# Patient Record
Sex: FEMALE | Race: ASIAN | HISPANIC OR LATINO
[De-identification: names, ages, dates, MRNs, and addresses within clinical notes are randomized per-mention and may not be internally consistent; named-entity substitution may affect disease eponyms.]

---

## 2021-02-13 DIAGNOSIS — Z01.818 ENCOUNTER FOR OTHER PREPROCEDURAL EXAMINATION: ICD-10-CM

## 2021-02-13 PROBLEM — Z00.00 ENCOUNTER FOR PREVENTIVE HEALTH EXAMINATION: Status: ACTIVE | Noted: 2021-02-13

## 2021-02-15 ENCOUNTER — APPOINTMENT (OUTPATIENT)
Dept: DISASTER EMERGENCY | Facility: CLINIC | Age: 56
End: 2021-02-15

## 2021-02-16 LAB — SARS-COV-2 N GENE NPH QL NAA+PROBE: NOT DETECTED

## 2022-06-09 ENCOUNTER — APPOINTMENT (OUTPATIENT)
Dept: ORTHOPEDIC SURGERY | Facility: CLINIC | Age: 57
End: 2022-06-09
Payer: COMMERCIAL

## 2022-06-09 VITALS — WEIGHT: 250 LBS | BODY MASS INDEX: 39.24 KG/M2 | HEIGHT: 67 IN

## 2022-06-09 DIAGNOSIS — I10 ESSENTIAL (PRIMARY) HYPERTENSION: ICD-10-CM

## 2022-06-09 PROCEDURE — 73503 X-RAY EXAM HIP UNI 4/> VIEWS: CPT | Mod: RT

## 2022-06-09 PROCEDURE — 99213 OFFICE O/P EST LOW 20 MIN: CPT

## 2022-06-09 RX ORDER — ATENOLOL 50 MG/1
TABLET ORAL
Refills: 0 | Status: ACTIVE | COMMUNITY

## 2022-06-09 NOTE — IMAGING
[Right] : right hip with pelvis [All Views] : anteroposterior, lateral [Severe arthritis (Tonnis Grade 3)] : Severe arthritis (Tonnis Grade 3) [de-identified] : Constitutional: The general appearance of the patient shows obesity. The general appearance of the patient is\par well groomed. Examination of patients ability to communicate functionally was normal. \par \par Skin: Skin of the head and face is normal without rashes, lesions or ulcers. Skin of the left lower extremities is normal\par without rashes, lesions, or ulcers. Skin of the right lower extremity is normal without rashes, lesions or ulcers. \par \par Neurologic: Alert and oriented to time, place and person. No evidence of mood disorder, calm affect. \par \par Back, including spine: Inspection of the thoracic/lumbar spine is as follows: mild dextroscoliosis Palpation of the\par thoracic/lumbar spine is as follows: no lumbar paraspinal spasm, no thoracic paraspinal spasm, no lumbar paraspinal\par tenderness, no thoracic paraspinal tenderness, no tenderness over left sciatic nerve and no tenderness over right\par sciatic nerve. Range of motion of the thoracic and lumbar spine is as follows: stiffness at extremes\par extensions. Strength Testing of the thoracic and lumbar spine is as follows: motor exam is 5/5 throughout both lower extremities with normal tone Special testing of the thoracic and lumbar spine is as follows: negative facet loading on\par left side, negative facet loading on right side, negative sitting straight leg raise on right and negative sitting straight leg\par raise on left Neurological testing of the thoracic and lumbar spine is as follows: light touch is intact throughout\par both lower extremities, no sustained clonus at ankles, negative Babiniski test bilaterally and sensory exam is non-focal\par throughout both lower extremities Pt is guarding Gait and function is as follows: patient ambulates without\par assistive device and antalgic gait. \par \par Right Hip/Thigh: Inspection of the hip/thigh is as follows: Inspection shows no swelling, no ecchymosis and no\par erythema. Palpation of the hip/thigh is as follows: groin tenderness, buttock tenderness and anterior thigh\par tenderness. Range of motion of the hip is as follows: limited internal rotation, limited external rotation, limited\par flexion, pain with crossing legs, pain with flexion and internal rotation and pain with flexion and external\par rotation. Flexion is 50 degrees. Extension is 15 degrees. Strength testing of the hip/thigh is as follows: Hip\par flexion strength is 4+/5. Hip abduction strength is 4+/5. Special tests of the hip/thigh is as\par follows: Impingement test is positive, pain in groin with internal rotation and pain in groin with external\par rotation. Neurological testing of the hip/thigh is as follows: light touch intact throughout and no focal motor\par deficits. Gait and function is as follows: patient ambulates without assistive device and antalgic gait. \par \par Left Hip/Thigh: Inspection of the hip/thigh is as follows: Inspection shows no swelling, no ecchymosis, no erythema\par and no scars. Palpation of the hip/thigh is as follows: no tenderness anywhere. Range of motion of the hip is as\par follows: limited internal rotation and pain with flexion and external rotation. no pain with log-rolling of hip and no\par pain with hip flexion. relatively mild Strength testing of the hip/thigh is as follows: Hip flexion strength is 5/5, Hip\par extension strength is 5/5, Hip abductionstrength is 5/5 and Hip adductionstrength is 5/5. Special tests of the\par hip/thigh is as follows: pain in groin with external rotation and groin pain with resisted straight leg\par raise. negative Gypsy's test. mild Neurological testing of the hip/thigh is as follows: motor exam 5/5 throughout and no\par focal motor deficits. Gait and function is as follows: patient ambulates without assistive device and antalgic\par gait.

## 2022-06-09 NOTE — ASSESSMENT
[FreeTextEntry1] : 56 year F WITH MODERATE RT HIP PAIN. HAS SEEN DR. STEVENS IN THE PAST. WAS LAST HERE IN NOVEMBER 2021. STATES PAIN IS GETTING WORSE. HAS TRIED HIP INJECTIONS IN THE PAST WITH MINIMAL RELIEF. PAIN IS IN THE GROIN AND DOES NOT RADIATE.  PAIN WORSENS WITH WALKING PROLONGED DISTANCES. PAIN IS AFFECTING ADL AND FUNCTIONAL ACTIVITIES; PUTTING ON SHOES AND SOCKS. XRAYS REVIEWED WITH ADVANCED OA. TREATMENT OPTIONS REVIEWED. DISCUSSED POSSIBLE SCOTTY  AT LAST VISIT HOWEVER DID NOT MOVE FORWARD DUE TO HER WEIGHT. DISCUSSED WEIGHT LOSS AGAIN. \par \par IT IS MEDICALLY NECESSARY TO WORK FROM HOME. \par \par PMHx: HTN, HLD, HYPOTHYROIDISM\par NO METAL ALLERGIES. NO DVT/PE.

## 2022-06-09 NOTE — HISTORY OF PRESENT ILLNESS
[Right Leg] : right leg [Gradual] : gradual [8] : 8 [7] : 7 [Localized] : localized [Sharp] : sharp [Constant] : constant [Household chores] : household chores [Nothing helps with pain getting better] : Nothing helps with pain getting better [Standing] : standing [Walking] : walking [Stairs] : stairs [Lying in bed] : lying in bed [] : yes [de-identified] : pt presents here today for a follow up on the right hip\par pain is getting worse\par patient has try hip injections in the past with very little relief  [FreeTextEntry1] : hip [FreeTextEntry5] : no injury  [de-identified] : hip injection

## 2023-01-05 ENCOUNTER — APPOINTMENT (OUTPATIENT)
Dept: ORTHOPEDIC SURGERY | Facility: CLINIC | Age: 58
End: 2023-01-05
Payer: COMMERCIAL

## 2023-01-05 ENCOUNTER — NON-APPOINTMENT (OUTPATIENT)
Age: 58
End: 2023-01-05

## 2023-01-05 VITALS — HEIGHT: 67 IN | WEIGHT: 250 LBS | BODY MASS INDEX: 39.24 KG/M2

## 2023-01-05 DIAGNOSIS — M16.11 UNILATERAL PRIMARY OSTEOARTHRITIS, RIGHT HIP: ICD-10-CM

## 2023-01-05 DIAGNOSIS — E66.9 OBESITY, UNSPECIFIED: ICD-10-CM

## 2023-01-05 PROCEDURE — 99213 OFFICE O/P EST LOW 20 MIN: CPT

## 2023-01-05 NOTE — IMAGING
[Right] : right hip with pelvis [All Views] : anteroposterior, lateral [Severe arthritis (Tonnis Grade 3)] : Severe arthritis (Tonnis Grade 3) [de-identified] : Constitutional: The general appearance of the patient shows obesity. The general appearance of the patient is\par well groomed. Examination of patients ability to communicate functionally was normal. \par \par Skin: Skin of the head and face is normal without rashes, lesions or ulcers. Skin of the left lower extremities is normal\par without rashes, lesions, or ulcers. Skin of the right lower extremity is normal without rashes, lesions or ulcers. \par \par Neurologic: Alert and oriented to time, place and person. No evidence of mood disorder, calm affect. \par \par Back, including spine: Inspection of the thoracic/lumbar spine is as follows: mild dextroscoliosis Palpation of the\par thoracic/lumbar spine is as follows: no lumbar paraspinal spasm, no thoracic paraspinal spasm, no lumbar paraspinal\par tenderness, no thoracic paraspinal tenderness, no tenderness over left sciatic nerve and no tenderness over right\par sciatic nerve. Range of motion of the thoracic and lumbar spine is as follows: stiffness at extremes\par extensions. Strength Testing of the thoracic and lumbar spine is as follows: motor exam is 5/5 throughout both lower extremities with normal tone Special testing of the thoracic and lumbar spine is as follows: negative facet loading on\par left side, negative facet loading on right side, negative sitting straight leg raise on right and negative sitting straight leg\par raise on left Neurological testing of the thoracic and lumbar spine is as follows: light touch is intact throughout\par both lower extremities, no sustained clonus at ankles, negative Babiniski test bilaterally and sensory exam is non-focal\par throughout both lower extremities Pt is guarding Gait and function is as follows: patient ambulates with a cane.\par \par Right Hip/Thigh: Inspection of the hip/thigh is as follows: Inspection shows no swelling, no ecchymosis and no\par erythema. Palpation of the hip/thigh is as follows: groin tenderness, buttock tenderness and anterior thigh\par tenderness. Range of motion of the hip is as follows: limited internal rotation, limited external rotation, limited\par flexion, pain with crossing legs, pain with flexion and internal rotation and pain with flexion and external\par rotation. Flexion is 50 degrees. Extension is 15 degrees. Strength testing of the hip/thigh is as follows: Hip\par flexion strength is 4+/5. Hip abduction strength is 4+/5. Special tests of the hip/thigh is as\par follows: Impingement test is positive, pain in groin with internal rotation and pain in groin with external\par rotation. Neurological testing of the hip/thigh is as follows: light touch intact throughout and no focal motor\par deficits. Gait and function is as follows: patient ambulates with a cane. \par \par Left Hip/Thigh: Inspection of the hip/thigh is as follows: Inspection shows no swelling, no ecchymosis, no erythema\par and no scars. Palpation of the hip/thigh is as follows: no tenderness anywhere. Range of motion of the hip is as\par follows: limited internal rotation and pain with flexion and external rotation. no pain with log-rolling of hip and no\par pain with hip flexion. relatively mild Strength testing of the hip/thigh is as follows: Hip flexion strength is 5/5, Hip\par extension strength is 5/5, Hip abduction strength is 5/5 and Hip adduction strength is 5/5. Special tests of the\par hip/thigh is as follows: pain in groin with external rotation and groin pain with resisted straight leg\par raise. negative Gypsy's test. mild Neurological testing of the hip/thigh is as follows: motor exam 5/5 throughout and no\par focal motor deficits. Gait and function is as follows: patient ambulates with a cane.

## 2023-01-05 NOTE — ASSESSMENT
[FreeTextEntry1] : 57 year F WITH MODERATE RT HIP PAIN. HAS SEEN DR. STEVENS IN THE PAST. STATES PAIN IS GETTING WORSE. HAS TRIED HIP INJECTIONS IN THE PAST WITH MINIMAL RELIEF. PAIN IS IN THE GROIN AND DOES NOT RADIATE.  PAIN WORSENS WITH WALKING PROLONGED DISTANCES. PAIN IS AFFECTING ADL AND FUNCTIONAL ACTIVITIES; PUTTING ON SHOES AND SOCKS. XRAYS REVIEWED WITH ADVANCED OA. TREATMENT OPTIONS REVIEWED. DISCUSSED POSSIBLE SCOTTY  AT LAST VISIT HOWEVER DID NOT MOVE FORWARD DUE TO HER WEIGHT. WEIGHT LOSS AGAIN DISCUSSED. \par \par IT IS MEDICALLY NECESSARY TO WORK FROM HOME. \par \par PMHx: HTN, HLD, HYPOTHYROIDISM\par NO METAL ALLERGIES\par NO H/O DM\par NO H/O DVT/PE \par NO H/O MRSA/VRSA \par \par

## 2023-01-05 NOTE — HISTORY OF PRESENT ILLNESS
[Right Leg] : right leg [Gradual] : gradual [8] : 8 [7] : 7 [Localized] : localized [Sharp] : sharp [Constant] : constant [Household chores] : household chores [Nothing helps with pain getting better] : Nothing helps with pain getting better [Standing] : standing [Walking] : walking [Stairs] : stairs [Lying in bed] : lying in bed [] : yes [de-identified] : pt presents here today for a follow up on the right hip\par pain is getting worse\par patient has try hip injections in the past with very little relief  [FreeTextEntry1] : hip [FreeTextEntry5] : no injury  [de-identified] : hip injection

## 2023-07-06 ENCOUNTER — APPOINTMENT (OUTPATIENT)
Dept: ORTHOPEDIC SURGERY | Facility: CLINIC | Age: 58
End: 2023-07-06